# Patient Record
Sex: MALE | Race: WHITE
[De-identification: names, ages, dates, MRNs, and addresses within clinical notes are randomized per-mention and may not be internally consistent; named-entity substitution may affect disease eponyms.]

---

## 2021-06-24 ENCOUNTER — HOSPITAL ENCOUNTER (EMERGENCY)
Dept: HOSPITAL 53 - M ED | Age: 41
Discharge: HOME | End: 2021-06-24
Payer: COMMERCIAL

## 2021-06-24 VITALS — BODY MASS INDEX: 29.45 KG/M2 | WEIGHT: 187.61 LBS | HEIGHT: 67 IN

## 2021-06-24 VITALS — SYSTOLIC BLOOD PRESSURE: 134 MMHG | DIASTOLIC BLOOD PRESSURE: 86 MMHG
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
## 2021-06-24 DIAGNOSIS — S09.90XA: Primary | ICD-10-CM
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
## 2021-06-24 DIAGNOSIS — F17.210: ICD-10-CM

## 2021-06-24 DIAGNOSIS — S16.1XXA: ICD-10-CM

## 2021-06-24 NOTE — REP
INDICATION:

300-400 LB WEIGHT TO NECK/HEAD.



COMPARISON:

None.



TECHNIQUE:

Helical scanning is acquired. 5 mm axial images were reformatted.  Coronal MPR images

were generated.



FINDINGS:

Bone window settings demonstrate an intact bony calvarium.  There is no evidence of

skull fracture or incidental bony calvarial lesion.  The visualized paranasal sinuses

appear clear.  No intraorbital abnormality is seen.  On soft tissue window setting

images; the lateral, third, and fourth ventricles are normal in size and position.

Gray-white differentiation pattern is normal above and below the tentorium.  There are

is no evidence of intracranial hemorrhage.  No mass, edema, infarction, or midline

shift is seen.  No extra-axial fluid collection is appreciated.





IMPRESSION:

Negative noncontrast head CT.





<Electronically signed by Gerard Odom > 06/24/21 [id number]

## 2021-06-24 NOTE — REP
INDICATION:

300-400 LB WEIGHT TO NECK/HEAD.



COMPARISON:

None.



TECHNIQUE:

Helical scanning is acquired and overlapping 2 mm high resolution axial images were

generated and reviewed at bone and soft tissue window settings.  Coronal and sagittal

multiplanar re-formations images are generated.



FINDINGS:

There is no evidence of cervical spine element fracture.  No skull base fracture is

seen.  Cervical vertebral body heights are preserved.  Alignment is normal.  Facet

joints are normally aligned bilaterally at each cervical level on multiplanar

re-formations images.  There is no evidence of intraspinal or paraspinal hematoma.  No

extra vertebral abnormality is seen.



There is some reversal of the normal cervical lordosis.  There is disc space narrowing

at C3-4 disc and mild diffuse disc bulging is seen at this level with early spurring.

There is discogenic spurring anteriorly at C6-7 as well.



IMPRESSION:

Mild degenerative spondylosis changes at C3-4 and C6-7.  No traumatic abnormality

noted.  Reversal of the normal cervical lordosis.  Otherwise negative..





<Electronically signed by Gerard Odom > 06/24/21 [id number]